# Patient Record
Sex: MALE | Race: WHITE | NOT HISPANIC OR LATINO
[De-identification: names, ages, dates, MRNs, and addresses within clinical notes are randomized per-mention and may not be internally consistent; named-entity substitution may affect disease eponyms.]

---

## 2022-07-16 ENCOUNTER — APPOINTMENT (OUTPATIENT)
Dept: ORTHOPEDIC SURGERY | Facility: CLINIC | Age: 67
End: 2022-07-16

## 2022-07-16 VITALS — HEIGHT: 71 IN | WEIGHT: 195 LBS | BODY MASS INDEX: 27.3 KG/M2

## 2022-07-16 DIAGNOSIS — Z00.00 ENCOUNTER FOR GENERAL ADULT MEDICAL EXAMINATION W/OUT ABNORMAL FINDINGS: ICD-10-CM

## 2022-07-16 PROCEDURE — 72170 X-RAY EXAM OF PELVIS: CPT

## 2022-07-16 PROCEDURE — 99204 OFFICE O/P NEW MOD 45 MIN: CPT

## 2022-07-16 PROCEDURE — 72100 X-RAY EXAM L-S SPINE 2/3 VWS: CPT

## 2022-07-16 RX ORDER — CYCLOBENZAPRINE HYDROCHLORIDE 10 MG/1
10 TABLET, FILM COATED ORAL
Qty: 30 | Refills: 0 | Status: ACTIVE | COMMUNITY
Start: 2022-07-16 | End: 1900-01-01

## 2022-07-16 RX ORDER — METHYLPREDNISOLONE 4 MG/1
4 TABLET ORAL
Qty: 1 | Refills: 1 | Status: ACTIVE | COMMUNITY
Start: 2022-07-16 | End: 1900-01-01

## 2022-07-16 NOTE — HISTORY OF PRESENT ILLNESS
[5] : 5 [9] : 9 [Throbbing] : throbbing [Sleep] : sleep [Sitting] : sitting [de-identified] : 7/16/22:  66M RHD here with left lataerl hip pain. Had a fall about 4 weeks ago, landed on his left side. pain in the left hip . He has some pain down his left lateral hip  and buttock. R leg is okay. Denies bb dysfunction\par \par saw the Pcp WHO rec tramadol but he was constipated \par tried heat pad without relief \par \par No prior injections/surgery\par \par PMH: none\par no hx cancer\par \par XRS today\par L spine - mild spondylosis \par \par xrays reports:\par left knee from zp 712 - normal\par left hip/pelvis from zp 7/12 normal  [] : no [FreeTextEntry1] : left lower back [FreeTextEntry5] : pt states that he fell on the left side of his body about a month ago and he has pain on his left lower back/ hip area [FreeTextEntry6] : discomfort [FreeTextEntry7] : down to left knee [de-identified] : laying down  [de-identified] : primary care doctor [de-identified] : xray

## 2022-07-16 NOTE — DISCUSSION/SUMMARY
[de-identified] : had a fall onto the left hip - severe pain in the left hip with a normal xray \par flexeril/mdp \par hip MRI on the left hip \par

## 2022-07-18 ENCOUNTER — FORM ENCOUNTER (OUTPATIENT)
Age: 67
End: 2022-07-18

## 2022-07-19 ENCOUNTER — APPOINTMENT (OUTPATIENT)
Dept: MRI IMAGING | Facility: CLINIC | Age: 67
End: 2022-07-19

## 2022-07-19 PROCEDURE — 73721 MRI JNT OF LWR EXTRE W/O DYE: CPT | Mod: LT

## 2022-07-22 ENCOUNTER — APPOINTMENT (OUTPATIENT)
Dept: ORTHOPEDIC SURGERY | Facility: CLINIC | Age: 67
End: 2022-07-22

## 2022-07-22 VITALS — BODY MASS INDEX: 27.3 KG/M2 | WEIGHT: 195 LBS | HEIGHT: 71 IN

## 2022-07-22 DIAGNOSIS — M24.152 OTHER ARTICULAR CARTILAGE DISORDERS, LEFT HIP: ICD-10-CM

## 2022-07-22 DIAGNOSIS — M70.62 TROCHANTERIC BURSITIS, LEFT HIP: ICD-10-CM

## 2022-07-22 DIAGNOSIS — M25.552 PAIN IN LEFT HIP: ICD-10-CM

## 2022-07-22 DIAGNOSIS — S70.02XA CONTUSION OF LEFT HIP, INITIAL ENCOUNTER: ICD-10-CM

## 2022-07-22 DIAGNOSIS — I10 ESSENTIAL (PRIMARY) HYPERTENSION: ICD-10-CM

## 2022-07-22 PROCEDURE — 20551 NJX 1 TENDON ORIGIN/INSJ: CPT

## 2022-07-22 PROCEDURE — J3490M: CUSTOM

## 2022-07-22 PROCEDURE — 99214 OFFICE O/P EST MOD 30 MIN: CPT | Mod: 25

## 2022-07-22 NOTE — PROCEDURE
[Tendon Origin] : tendon origin [Left] : of the left [Alcohol] : alcohol [Betadine] : betadine [Ethyl Chloride sprayed topically] : ethyl chloride sprayed topically [___ cc    1%] : Lidocaine ~Vcc of 1%  [___ cc    0.25%] : Bupivacaine (Marcaine) ~Vcc of 0.25%  [___ cc    10mg] : Triamcinolone (Kenalog) ~Vcc of 10 mg

## 2022-07-22 NOTE — DATA REVIEWED
[MRI] : MRI [Hip] : hip [Report was reviewed and noted in the chart] : The report was reviewed and noted in the chart [I independently reviewed and interpreted images and report] : I independently reviewed and interpreted images and report

## 2022-07-22 NOTE — HISTORY OF PRESENT ILLNESS
[7] : 7 [0] : 0 [Dull/Aching] : dull/aching [Localized] : localized [Lying in bed] : lying in bed [Retired] : Work status: retired [de-identified] : 7/16/22:  66M RHD here with left lataerl hip pain. Had a fall about 4 weeks ago, landed on his left side. pain in the left hip . He has some pain down his left lateral hip  and buttock. R leg is okay. Denies bb dysfunction\par \par saw the Pcp WHO rec tramadol but he was constipated \par tried heat pad without relief \par \par No prior injections/surgery\par \par PMH: none\par no hx cancer\par \par XRS today\par L spine - mild spondylosis \par \par xrays reports:\par left knee from z 712 - normal\par left hip/pelvis from  7/12 normal \par \par 7/22/22: Here to review MRI - plan at last was "had a fall onto the left hip - severe pain in the left hip with a normal xray - flexeril/mdp - hip MRI on the left hip " overall got some relief with relief of the medicatoin \par \par MRI L hip: 1. 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 Markedly enlarged prostate. This should be corelated with patient's clinical history and laboratory values.\par \par hes aware of the prostate enlargement  [] : Post Surgical Visit: no [FreeTextEntry1] : Left hip

## 2022-07-22 NOTE — DISCUSSION/SUMMARY
[de-identified] : MRI reviewed with patient - no signficant pathology \par has MDP \par left hip injection bursa today tolerated well \par